# Patient Record
Sex: FEMALE
[De-identification: names, ages, dates, MRNs, and addresses within clinical notes are randomized per-mention and may not be internally consistent; named-entity substitution may affect disease eponyms.]

---

## 2017-06-03 ENCOUNTER — HEALTH MAINTENANCE LETTER (OUTPATIENT)
Age: 32
End: 2017-06-03

## 2017-07-15 ENCOUNTER — HOSPITAL ENCOUNTER (EMERGENCY)
Facility: CLINIC | Age: 32
Discharge: HOME | End: 2017-07-15
Attending: EMERGENCY MEDICINE | Admitting: EMERGENCY MEDICINE
Payer: COMMERCIAL

## 2017-07-15 VITALS
OXYGEN SATURATION: 98 % | RESPIRATION RATE: 16 BRPM | TEMPERATURE: 98.9 F | SYSTOLIC BLOOD PRESSURE: 128 MMHG | DIASTOLIC BLOOD PRESSURE: 84 MMHG | HEART RATE: 84 BPM

## 2017-07-15 DIAGNOSIS — F60.3 BORDERLINE PERSONALITY DISORDER (H): ICD-10-CM

## 2017-07-15 PROCEDURE — 99283 EMERGENCY DEPT VISIT LOW MDM: CPT

## 2017-07-15 PROCEDURE — 99283 EMERGENCY DEPT VISIT LOW MDM: CPT | Mod: Z6 | Performed by: EMERGENCY MEDICINE

## 2017-07-15 ASSESSMENT — ENCOUNTER SYMPTOMS
CHILLS: 0
FEVER: 1
PSYCHIATRIC NEGATIVE: 1
ABDOMINAL PAIN: 0
APPETITE CHANGE: 0

## 2017-07-15 NOTE — ED NOTES
Writer walked into room to ask pt to provide a urine sample, stated she now wants to leave AMA, wanted to  therapist but doesn't want to talk to one now, MD assessed her and she is ok to go.

## 2017-07-15 NOTE — ED PROVIDER NOTES
History     Chief Complaint   Patient presents with     Flank Pain     Kidney infection.      Depression     Denies SI.     HPI  Guinepeng Nazario is a 31 year old female who presents to the ED to see if she had a fever.  She says she was just dx with a kidney infection yesterday, got a shot and was placed on bactrim.  She took one bactrim.  She denies prior kidney infections.  She thought she had a fever this am so came to see.  She now wants to leave and doesn't want to talk anymore.  She thought she wanted to see a therapist but doesn't want to now.  Denies si/hi.  Doesn't have a therapist and doesn't want one.     I have reviewed the Medications, Allergies, Past Medical and Surgical History, and Social History in the Epic system.    Review of Systems   Constitutional: Positive for fever. Negative for appetite change and chills.   Gastrointestinal: Negative for abdominal pain.   Psychiatric/Behavioral: Negative.        Physical Exam   BP: 128/84  Pulse: 84  Temp: 98.9  F (37.2  C)  Resp: 16  SpO2: 98 %  Physical Exam   Constitutional: She is oriented to person, place, and time. She appears well-developed and well-nourished. No distress.   HENT:   Head: Atraumatic.   Neck: Normal range of motion.   Pulmonary/Chest: Effort normal.   Musculoskeletal: Normal range of motion.   Neurological: She is alert and oriented to person, place, and time.   Skin: Skin is warm and dry. She is not diaphoretic.   Psychiatric: Her speech is normal and behavior is normal. Judgment and thought content normal. Her mood appears anxious. Cognition and memory are normal.   Nursing note and vitals reviewed.      ED Course     ED Course     Procedures           Labs Ordered and Resulted from Time of ED Arrival Up to the Time of Departure from the ED - No data to display         Assessments & Plan (with Medical Decision Making)   The patient wanted to leave before being seen by me.  I asked her to chat for a few minutes to assess  the situation.  She denies si/hi.  She seems irritated by being asked to stay and talk to me.  She shows me paperwork of dx of kidney infection and recommendation of treating it with bactrim.  I do not have a reason to hold her further.  Very limited exam and hx given her wanting to go.  Consistent with borderline personality disorder.     I have reviewed the nursing notes.    I have reviewed the findings, diagnosis, plan and need for follow up with the patient.    New Prescriptions    No medications on file       Final diagnoses:   Borderline personality disorder       7/15/2017   [facility], EMERGENCY DEPARTMENT     Marleni Nguyen MD  07/15/17 0907

## 2017-07-15 NOTE — ED AVS SNAPSHOT
[facility], [place], Emergency Department    [address]    [place] [address]    Phone:  [phone]    Fax:  [phone]                                       Fanny Nazario   MRN: [id number]    Department:  [facility], Emergency Department   Date of Visit:  7/15/2017           After Visit Summary Signature Page     I have received my discharge instructions, and my questions have been answered. I have discussed any challenges I see with this plan with the nurse or doctor.    ..........................................................................................................................................  Patient/Patient Representative Signature      ..........................................................................................................................................  Patient Representative Print Name and Relationship to Patient    ..................................................               ................................................  Date                                            Time    ..........................................................................................................................................  Reviewed by Signature/Title    ...................................................              ..............................................  Date                                                            Time

## 2017-07-15 NOTE — ED AVS SNAPSHOT
[facility], Emergency Department    [address]    [place] [address]    Phone:  [phone]    Fax:  [phone]                                       Fanny Nazario   MRN: [id number]    Department:  [facility], Emergency Department   Date of Visit:  7/15/2017           Patient Information     Date Of Birth          1985        Your diagnoses for this visit were:     Borderline personality disorder        You were seen by Marleni Nguyen MD.      24 Hour Appointment Hotline       To make an appointment at any [facility] clinic, call [phone] ([phone]). If you don't have a family doctor or clinic, we will help you find one. [facility] clinics are conveniently located to serve the needs of you and your family.             Review of your medicines      Our records show that you are taking the medicines listed below. If these are incorrect, please call your family doctor or clinic.        Dose / Directions Last dose taken    OLANZapine 2.5 MG tablet   Commonly known as:  zyPREXA   Dose:  2.5 mg   Quantity:  30 tablet        Take 1 tablet (2.5 mg) by mouth At Bedtime   Refills:  0        PARoxetine 20 MG tablet   Commonly known as:  PAXIL   Dose:  20 mg   Quantity:  30 tablet        Take 1 tablet (20 mg) by mouth At Bedtime   Refills:  1                Orders Needing Specimen Collection     Ordered          07/15/17 0854  UA reflex to Microscopic - STAT, Prio: STAT, Needs to be Collected     Scheduled Task Status   07/15/17 0855 Collect UA reflex to Microscopic Open   Order Class:  PCU Collect                07/15/17 0854  HCG qualitative urine - STAT, Prio: STAT, Needs to be Collected     Scheduled Task Status   07/15/17 0855 Collect HCG qualitative urine Open   Order Class:  PCU Collect                  Pending Results     No orders found from 7/13/2017 to 7/16/2017.            Pending Culture Results     No orders found from 7/13/2017 to 7/16/2017.            Pending Results  Instructions     If you had any lab results that were not finalized at the time of your Discharge, you can call the ED Lab Result RN at [phone]. You will be contacted by this team for any positive Lab results or changes in treatment. The nurses are available 7 days a week from 10A to 6:30P.  You can leave a message 24 hours per day and they will return your call.        Thank you for choosing [facility]       Thank you for choosing [facility] for your care. Our goal is always to provide you with excellent care. Hearing back from our patients is one way we can continue to improve our services. Please take a few minutes to complete the written survey that you may receive in the mail after you visit with us. Thank you!        [employer] Information     [facility] gives you secure access to your electronic health record. If you see a primary care provider, you can also send messages to your care team and make appointments. If you have questions, please call your primary care clinic.  If you do not have a primary care provider, please call [phone] and they will assist you.        Care EveryWhere ID     This is your Care EveryWhere ID. This could be used by other organizations to access your [facility] medical records  [id number]        Equal Access to Services     WINNIE HALL : Hadii sierra Nelson, aky carroll, sol alcazar, isaiah corea. So [facility] [phone].    ATENCIÓN: Si habla español, tiene a meza disposición servicios gratuitos de asistencia lingüística. Llame al [phone].    We comply with applicable federal civil rights laws and [place] laws. We do not discriminate on the basis of race, color, national origin, age, disability sex, sexual orientation or gender identity.            After Visit Summary       This is your record. Keep this with you and show to your community pharmacist(s) and doctor(s) at your next visit.

## 2017-07-17 ENCOUNTER — HOSPITAL ENCOUNTER (EMERGENCY)
Facility: CLINIC | Age: 32
Discharge: HOME | End: 2017-07-17
Attending: EMERGENCY MEDICINE | Admitting: EMERGENCY MEDICINE
Payer: COMMERCIAL

## 2017-07-17 VITALS
OXYGEN SATURATION: 100 % | TEMPERATURE: 98.6 F | SYSTOLIC BLOOD PRESSURE: 114 MMHG | RESPIRATION RATE: 16 BRPM | HEART RATE: 67 BPM | WEIGHT: 168.1 LBS | DIASTOLIC BLOOD PRESSURE: 68 MMHG | BODY MASS INDEX: 30.75 KG/M2

## 2017-07-17 DIAGNOSIS — N10 ACUTE PYELONEPHRITIS: ICD-10-CM

## 2017-07-17 DIAGNOSIS — F31.9 BIPOLAR AFFECTIVE DISORDER, REMISSION STATUS UNSPECIFIED (H): ICD-10-CM

## 2017-07-17 DIAGNOSIS — R45.1 AGITATION: ICD-10-CM

## 2017-07-17 LAB
ALBUMIN UR-MCNC: NEGATIVE MG/DL
AMPHETAMINES UR QL SCN: ABNORMAL
APPEARANCE UR: CLEAR
BARBITURATES UR QL: ABNORMAL
BENZODIAZ UR QL: ABNORMAL
BILIRUB UR QL STRIP: NEGATIVE
CANNABINOIDS UR QL SCN: ABNORMAL
COCAINE UR QL: ABNORMAL
COLOR UR AUTO: YELLOW
ETHANOL UR QL SCN: ABNORMAL
GLUCOSE UR STRIP-MCNC: NEGATIVE MG/DL
HCG UR QL: NEGATIVE
HGB UR QL STRIP: NEGATIVE
KETONES UR STRIP-MCNC: NEGATIVE MG/DL
LEUKOCYTE ESTERASE UR QL STRIP: NEGATIVE
MUCOUS THREADS #/AREA URNS LPF: PRESENT /LPF
NITRATE UR QL: NEGATIVE
OPIATES UR QL SCN: ABNORMAL
PH UR STRIP: 6.5 PH (ref 5–7)
RBC #/AREA URNS AUTO: 4 /HPF (ref 0–2)
SP GR UR STRIP: 1.01 (ref 1–1.03)
SQUAMOUS #/AREA URNS AUTO: <1 /HPF (ref 0–1)
URN SPEC COLLECT METH UR: ABNORMAL
UROBILINOGEN UR STRIP-MCNC: NORMAL MG/DL (ref 0–2)
WBC #/AREA URNS AUTO: 1 /HPF (ref 0–2)

## 2017-07-17 PROCEDURE — 80307 DRUG TEST PRSMV CHEM ANLYZR: CPT | Performed by: PSYCHIATRY & NEUROLOGY

## 2017-07-17 PROCEDURE — 25000132 ZZH RX MED GY IP 250 OP 250 PS 637: Performed by: EMERGENCY MEDICINE

## 2017-07-17 PROCEDURE — 81001 URINALYSIS AUTO W/SCOPE: CPT | Performed by: EMERGENCY MEDICINE

## 2017-07-17 PROCEDURE — 99284 EMERGENCY DEPT VISIT MOD MDM: CPT | Mod: Z6 | Performed by: EMERGENCY MEDICINE

## 2017-07-17 PROCEDURE — 80320 DRUG SCREEN QUANTALCOHOLS: CPT | Performed by: PSYCHIATRY & NEUROLOGY

## 2017-07-17 PROCEDURE — 81025 URINE PREGNANCY TEST: CPT | Performed by: PSYCHIATRY & NEUROLOGY

## 2017-07-17 PROCEDURE — 90791 PSYCH DIAGNOSTIC EVALUATION: CPT

## 2017-07-17 PROCEDURE — 99285 EMERGENCY DEPT VISIT HI MDM: CPT | Mod: 25 | Performed by: EMERGENCY MEDICINE

## 2017-07-17 RX ORDER — SULFAMETHOXAZOLE/TRIMETHOPRIM 800-160 MG
1 TABLET ORAL 2 TIMES DAILY
COMMUNITY
End: 2017-07-17 | Stop reason: ALTCHOICE

## 2017-07-17 RX ORDER — CARBAMAZEPINE 100 MG/1
100 TABLET, EXTENDED RELEASE ORAL 2 TIMES DAILY
Qty: 60 TABLET | Refills: 0 | Status: SHIPPED | OUTPATIENT
Start: 2017-07-17

## 2017-07-17 RX ORDER — CIPROFLOXACIN 500 MG/1
500 TABLET, FILM COATED ORAL 2 TIMES DAILY
Qty: 20 TABLET | Refills: 0 | Status: SHIPPED | OUTPATIENT
Start: 2017-07-17 | End: 2017-07-27

## 2017-07-17 RX ORDER — CIPROFLOXACIN 500 MG/1
500 TABLET, FILM COATED ORAL ONCE
Status: COMPLETED | OUTPATIENT
Start: 2017-07-17 | End: 2017-07-17

## 2017-07-17 RX ADMIN — CIPROFLOXACIN HYDROCHLORIDE 500 MG: 500 TABLET, FILM COATED ORAL at 15:43

## 2017-07-17 ASSESSMENT — ENCOUNTER SYMPTOMS
DYSPHORIC MOOD: 0
HALLUCINATIONS: 0
ABDOMINAL PAIN: 0
CHEST TIGHTNESS: 0
BACK PAIN: 0
NERVOUS/ANXIOUS: 0
DIZZINESS: 0
AGITATION: 1
FEVER: 0
FLANK PAIN: 1
SHORTNESS OF BREATH: 0

## 2017-07-17 NOTE — ED PROVIDER NOTES
History     Chief Complaint   Patient presents with     Flank Pain     left flank pain, was treated a couple of days ago for a kidney infection. continues to have left flank pain, is starting to get pain in the right flank. Pt states the antibiotics are causing serious mood changes     The history is provided by the patient and medical records.     Fanny Nazario is a 31 year old female who comes from the ED after she medically treated for her pyelonephritis.  She asked the MD in the ED for medications for her bipolar.  Please see Dr. Rodarte's note for details and physical exam.  She states that she has bipolar disorder mixed state.  The records discuss PTSD and borderline personality disorder as well.  She is irritable and opinionated about her care.  She states she has tried lamictal and got a rash. She also has tried many other medication causing more issues or they didn't work. She denies any suicidal or homicidal thoughts.  She feels sad but also restless. She is frustrated how easily upset she gets.  She has done research on tegretol and she believes it is the best medication for her.  She would like to start this as she thinks it will be helpful. She does not want therapy.    Please see the 's assessment in [employer] from today for further details.    I have reviewed the Medications, Allergies, Past Medical and Surgical History, and Social History in the Epic system.    Review of Systems   Constitutional: Negative for fever.   Eyes: Negative for visual disturbance.   Respiratory: Negative for chest tightness and shortness of breath.    Cardiovascular: Negative for chest pain.   Gastrointestinal: Negative for abdominal pain.   Musculoskeletal: Negative for back pain.   Neurological: Negative for dizziness.   Psychiatric/Behavioral: Positive for agitation. Negative for dysphoric mood, hallucinations, self-injury and suicidal ideas. The patient is not nervous/anxious.    All other systems  reviewed and are negative.      Physical Exam   BP: 111/67  Pulse: 86  Temp: 98.9  F (37.2  C)  Resp: 16  Weight: 76.2 kg (168 lb 1.6 oz)  SpO2: 92 %  Physical Exam   Constitutional: She is oriented to person, place, and time. She appears well-developed and well-nourished.   Neurological: She is alert and oriented to person, place, and time.   Psychiatric: She has a normal mood and affect. Her speech is normal. Judgment and thought content normal. She is agitated. She is not actively hallucinating. Thought content is not paranoid and not delusional. Cognition and memory are normal. She expresses no homicidal and no suicidal ideation. She expresses no suicidal plans and no homicidal plans.   Fanny is a 32 y/o female who looks her age.  She is well groomed with good eye contact.  She is somewhat irritable but does calm and redirect.   Nursing note and vitals reviewed.      ED Course     ED Course     Procedures               Labs Ordered and Resulted from Time of ED Arrival Up to the Time of Departure from the ED   ROUTINE UA WITH MICROSCOPIC REFLEX TO CULTURE - Abnormal; Notable for the following:        Result Value    RBC Urine 4 (*)     Mucous Urine Present (*)     All other components within normal limits   DRUG ABUSE SCREEN 6 CHEM DEP URINE ([facility]) - Abnormal; Notable for the following:     Cannabinoids Qual Urine   (*)     Value: Positive   Cutoff for a positive cannabinoid is greater than 50 ng/mL. This is an   unconfirmed screening result to be used for medical purposes only.      All other components within normal limits   HCG QUALITATIVE URINE            Assessments & Plan (with Medical Decision Making)   Fanny will be discharged home.  She is not an imminent risk to herself or others.  She will be started on tegretol  mg bid.  She had done research and felt this is the best medication for her bipolar mixed state.  She was well versed in its side effects.  This seems to be decent choice for her  bipolar considering she has failed other medications and she has investment in this medication.  She understands that she needs close follow up for this including labwork.  She got an appointment with a medication provider on 8/10/17.  She is hoping to get into the [facility] psychiatry clinic as she has been there before, but she may not be able to get into an appointment soon enough.  I did explain to her that I would start the medication if she had a follow up appointment so she took the appointment on 8/10/17.      I have reviewed the nursing notes.    I have reviewed the findings, diagnosis, plan and need for follow up with the patient.    New Prescriptions    CARBAMAZEPINE (TEGRETOL XR) 100 MG 12 HR TABLET    Take 1 tablet (100 mg) by mouth 2 times daily    CIPROFLOXACIN (CIPRO) 500 MG TABLET    Take 1 tablet (500 mg) by mouth 2 times daily for 10 days       Final diagnoses:   Acute pyelonephritis - left, partially treated   Agitation   Bipolar affective disorder, remission status unspecified (H)       7/17/2017   [facility], [place], EMERGENCY DEPARTMENT     Gagan Cain MD  07/17/17 0017

## 2017-07-17 NOTE — ED PROVIDER NOTES
History     Chief Complaint   Patient presents with     Flank Pain     left flank pain, was treated a couple of days ago for a kidney infection. continues to have left flank pain, is starting to get pain in the right flank. Pt states the antibiotics are causing serious mood changes     HPI  Fanny Nazario is a 31-year-old female who presents to the Emergency Department with 2 complaints.  The 1st complaint is that she has recently been treated for a left pyelonephritis and was placed on Bactrim 3 days ago but recently found out that the Bactrim is not effective for her E. coli UTI and that her left flank pain continues.  Patient states that she was telephoned by the clinic and was told to switch antibiotics.  Meanwhile the patient also complains that the Bactrim is causing her to be increasingly agitated.  Patient states that she would like to see our psychiatrist here to get evaluated for her agitation. This patient has a diagnosis of borderline personality and has been treated with antidepressants here in the past but is not currently on any antidepressants.  Patient denies any vomiting and denies possibility of pregnancy stating that her tubes are tied and gave verbal permission to look into care everywhere for her [facility].    Care everywhere revealed the patient has no official lab results in the chart but the narrative in the chart reveals that the patient did have red cells and white cells and was started on Bactrim.  Sensitivities were not fully explained however the note does say that the patient was to be switched to Cipro because the bacteria, E. coli, is resistant to Bactrim.  Her pregnancy test was negative in the narrative notes.    I have reviewed the Medications, Allergies, Past Medical and Surgical History, and Social History in the [facility] system.    PAST MEDICAL HISTORY:   Past Medical History:   Diagnosis Date     Bipolar 1 disorder (H)      Borderline personality disorder       Endometriosis      H/O LEEP (loop electrosurgical excision procedure) of cervix complicating pregnancy          H/O premature delivery      Herpes genitalia      History of cervical cerclage, currently pregnant      HPV in female      Mood disorder (H)       (normal spontaneous vaginal delivery)     x2     Postpartum depression      PROM (premature rupture of membranes)     at 27 weeks x2     PTSD (post-traumatic stress disorder)      STD (female)        PAST SURGICAL HISTORY:   Past Surgical History:   Procedure Laterality Date     ABDOMEN SURGERY      laparscopy, r/o ectopic-ruptured cyst      SECTION, TUBAL LIGATION, COMBINED  2014    Procedure: COMBINED  SECTION, TUBAL LIGATION;;  Surgeon: Becky Velazquez MD;  Location:  L+D     GYN SURGERY  2005    laparoscopy, endometriosis     LEEP TX, CERVICAL  2004       FAMILY HISTORY:   Family History   Problem Relation Age of Onset     DIABETES Maternal Grandmother      Hypertension Maternal Aunt      Blood Disease Mother      AIDS       SOCIAL HISTORY:   Social History   Substance Use Topics     Smoking status: Former Smoker     Packs/day: 0.50     Years: 13.00     Types: Cigarettes     Quit date: 2013     Smokeless tobacco: Never Used     Alcohol use No     Previous Medications    No medications on file        Allergies   Allergen Reactions     Depacon [Valproic Acid]      Migrane     Paxil [Paroxetine]      Lamictal [Lamotrigine] Rash         Review of Systems   Genitourinary: Positive for flank pain.   Psychiatric/Behavioral: Positive for agitation.   All other systems reviewed and are negative.      Physical Exam   BP: 111/67  Pulse: 86  Temp: 98.9  F (37.2  C)  Resp: 16  Weight: 76.2 kg (168 lb 1.6 oz)  SpO2: 92 %  Physical Exam   Constitutional: She is oriented to person, place, and time.   Calm, sitting comfortably and conversant   HENT:   Head: Atraumatic.   Eyes: EOM are normal. Pupils are equal, round, and reactive to  light.   Neck: Neck supple.   Abdominal: Soft. There is no tenderness.   Musculoskeletal: She exhibits no edema or tenderness.   Neurological: She is alert and oriented to person, place, and time.   Grossly intact and symmetric   Skin: Skin is warm. She is not diaphoretic.   Psychiatric:   Slightly agitated       ED Course     ED Course     Procedures        Results for orders placed or performed during the hospital encounter of 07/17/17   UA with Microscopic reflex to Culture   Result Value Ref Range    Color Urine Yellow     Appearance Urine Clear     Glucose Urine Negative NEG mg/dL    Bilirubin Urine Negative NEG    Ketones Urine Negative NEG mg/dL    Specific Gravity Urine 1.012 1.003 - 1.035    Blood Urine Negative NEG    pH Urine 6.5 5.0 - 7.0 pH    Protein Albumin Urine Negative NEG mg/dL    Urobilinogen mg/dL Normal 0.0 - 2.0 mg/dL    Nitrite Urine Negative NEG    Leukocyte Esterase Urine Negative NEG    Source Unspecified Urine     WBC Urine 1 0 - 2 /HPF    RBC Urine 4 (H) 0 - 2 /HPF    Squamous Epithelial /HPF Urine <1 0 - 1 /HPF    Mucous Urine Present (A) NEG /LPF       Labs Ordered and Resulted from Time of ED Arrival Up to the Time of Departure from the ED   ROUTINE UA WITH MICROSCOPIC REFLEX TO CULTURE - Abnormal; Notable for the following:        Result Value    RBC Urine 4 (*)     Mucous Urine Present (*)     All other components within normal limits            Assessments & Plan (with Medical Decision Making)     I have reviewed the nursing notes.    Patient will be switched from the Bactrim to Cipro as indicated in the primary care providers notes.  Patient's urine testing here is unimpressive.  Patient will have the Bactrim discontinued.  No further medical workup is necessary at this time.    Medications   ciprofloxacin (CIPRO) tablet 500 mg (500 mg Oral Given 7/17/17 3877)       Patient still wishes to be seen in the behavioral emergency center for her increasing agitation.    I have reviewed  the findings, diagnosis, and plan with the patient.    New Prescriptions    CIPROFLOXACIN (CIPRO) 500 MG TABLET    Take 1 tablet (500 mg) by mouth 2 times daily for 10 days       Working diagnoses:   Acute pyelonephritis - left, partially treated   Agitation     Please make an appointment to follow up with Your Primary Care Provider in 7-10 days for recheck.    Return to the ER for fevers or vomiting.    I, Ike Barillas, am serving as a trained medical scribe to document services personally performed by Filemon Rodarte MD, based on the provider's statements to me.   IFilemon MD, was physically present and have reviewed and verified the accuracy of this note documented by Ike Barillas.      7/17/2017   [facility], [place], EMERGENCY DEPARTMENT     Filemon Rodarte MD  07/17/17 1543

## 2017-07-17 NOTE — ED NOTES
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  Denies feeling suicidal.

## 2017-07-17 NOTE — ED AVS SNAPSHOT
[facility], [place], Emergency Department    [address]    [place] [address]    Phone:  [phone]    Fax:  [phone]                                       Fanny Nazario   MRN: [id number]    Department:  [facility], Emergency Department   Date of Visit:  7/17/2017           After Visit Summary Signature Page     I have received my discharge instructions, and my questions have been answered. I have discussed any challenges I see with this plan with the nurse or doctor.    ..........................................................................................................................................  Patient/Patient Representative Signature      ..........................................................................................................................................  Patient Representative Print Name and Relationship to Patient    ..................................................               ................................................  Date                                            Time    ..........................................................................................................................................  Reviewed by Signature/Title    ...................................................              ..............................................  Date                                                            Time

## 2017-07-17 NOTE — DISCHARGE INSTRUCTIONS
Please make an appointment to follow up with Your Primary Care Provider in 7-10 days for recheck.    Return to the ER for fevers or vomiting.    Start tegretol xr 100 mg twice a day.   You need to follow up with psychiatry for labs and medication management.  If a rash develops, stop medication and see your provider    Go to psychiatry on Thursday August 10th at 2 pm

## 2017-07-17 NOTE — ED AVS SNAPSHOT
[facility], Emergency Department    [address]    [place] [address]    Phone:  [phone]    Fax:  [phone]                                       Fanny Nazario   MRN: [id number]    Department:  [facility], Emergency Department   Date of Visit:  7/17/2017           Patient Information     Date Of Birth          1985        Your diagnoses for this visit were:     Acute pyelonephritis left, partially treated    Agitation     Bipolar affective disorder, remission status unspecified (H)        You were seen by Filemon Rodarte MD and Gagan Cain MD.        Discharge Instructions       Please make an appointment to follow up with Your Primary Care Provider in 7-10 days for recheck.    Return to the ER for fevers or vomiting.    Start tegretol xr 100 mg twice a day.   You need to follow up with psychiatry for labs and medication management.  If a rash develops, stop medication and see your provider    Go to psychiatry on Thursday August 10th at 2 pm      Discharge References/Attachments     PYELONEPHRITIS, FEMALE (ADULT) (ENGLISH)      24 Hour Appointment Hotline       To make an appointment at any [facility] clinic, call [phone] ([phone]). If you don't have a family doctor or clinic, we will help you find one. [facility] clinics are conveniently located to serve the needs of you and your family.             Review of your medicines      START taking        Dose / Directions Last dose taken    carBAMazepine 100 MG 12 hr tablet   Commonly known as:  TEGretol XR   Dose:  100 mg   Quantity:  60 tablet        Take 1 tablet (100 mg) by mouth 2 times daily   Refills:  0        ciprofloxacin 500 MG tablet   Commonly known as:  CIPRO   Dose:  500 mg   Indication:  Urinary Tract Infection   Quantity:  20 tablet        Take 1 tablet (500 mg) by mouth 2 times daily for 10 days   Refills:  0          STOP taking        Dose Reason for stopping Comments     sulfamethoxazole-trimethoprim 800-160 MG per tablet   Commonly known as:  BACTRIM DS/SEPTRA DS                      Prescriptions were sent or printed at these locations (2 Prescriptions)                   Other Prescriptions                Printed at Department/Unit printer (2 of 2)         ciprofloxacin (CIPRO) 500 MG tablet               carBAMazepine (TEGRETOL XR) 100 MG 12 hr tablet                Procedures and tests performed during your visit     Drug abuse screen 6 urine (tox)    HCG qualitative urine    UA with Microscopic reflex to Culture      Orders Needing Specimen Collection     None      Pending Results     No orders found from 7/15/2017 to 7/18/2017.            Pending Culture Results     No orders found from 7/15/2017 to 7/18/2017.            Pending Results Instructions     If you had any lab results that were not finalized at the time of your Discharge, you can call the ED Lab Result RN at [phone]. You will be contacted by this team for any positive Lab results or changes in treatment. The nurses are available 7 days a week from 10A to 6:30P.  You can leave a message 24 hours per day and they will return your call.        Thank you for choosing [facility]       Thank you for choosing [facility] for your care. Our goal is always to provide you with excellent care. Hearing back from our patients is one way we can continue to improve our services. Please take a few minutes to complete the written survey that you may receive in the mail after you visit with us. Thank you!        Who What [facility] Information     [employer] gives you secure access to your electronic health record. If you see a primary care provider, you can also send messages to your care team and make appointments. If you have questions, please call your primary care clinic.  If you do not have a primary care provider, please call [phone] and they will assist you.        Care EveryWhere ID     This is your Care EveryWhere ID. This could be  used by other organizations to access your [facility] medical records  [id number]        Equal Access to Services     WINNIE HALL : Lamine Nelson, kay carroll, isaiah lopez. So [facility] [phone].    ATENCIÓN: Si habla español, tiene a meza disposición servicios gratuitos de asistencia lingüística. Llame al [phone].    We comply with applicable federal civil rights laws and [place] laws. We do not discriminate on the basis of race, color, national origin, age, disability sex, sexual orientation or gender identity.            After Visit Summary       This is your record. Keep this with you and show to your community pharmacist(s) and doctor(s) at your next visit.

## 2017-07-17 NOTE — SUMMARY OF CARE
Patient safety screen completed; pt wanded with metal detector and belongings given to security to be stored. Explained to patient that she would go over to the [facility] when a bed is available to meet with a doctor and an accessor; plan will be determined from there.

## 2018-06-09 ENCOUNTER — HEALTH MAINTENANCE LETTER (OUTPATIENT)
Age: 33
End: 2018-06-09

## 2019-09-28 ENCOUNTER — HEALTH MAINTENANCE LETTER (OUTPATIENT)
Age: 34
End: 2019-09-28

## 2021-01-10 ENCOUNTER — HEALTH MAINTENANCE LETTER (OUTPATIENT)
Age: 36
End: 2021-01-10

## 2021-10-23 ENCOUNTER — HEALTH MAINTENANCE LETTER (OUTPATIENT)
Age: 36
End: 2021-10-23

## 2022-02-12 ENCOUNTER — HEALTH MAINTENANCE LETTER (OUTPATIENT)
Age: 37
End: 2022-02-12

## 2022-10-06 PROCEDURE — 88307 TISSUE EXAM BY PATHOLOGIST: CPT | Mod: TC,ORL | Performed by: STUDENT IN AN ORGANIZED HEALTH CARE EDUCATION/TRAINING PROGRAM

## 2022-10-07 ENCOUNTER — LAB REQUISITION (OUTPATIENT)
Dept: LAB | Facility: CLINIC | Age: 37
End: 2022-10-07
Payer: COMMERCIAL

## 2022-10-10 ENCOUNTER — HEALTH MAINTENANCE LETTER (OUTPATIENT)
Age: 37
End: 2022-10-10

## 2022-10-10 LAB
PATH REPORT.COMMENTS IMP SPEC: NORMAL
PATH REPORT.COMMENTS IMP SPEC: NORMAL
PATH REPORT.FINAL DX SPEC: NORMAL
PATH REPORT.GROSS SPEC: NORMAL
PATH REPORT.MICROSCOPIC SPEC OTHER STN: NORMAL
PATH REPORT.RELEVANT HX SPEC: NORMAL
PHOTO IMAGE: NORMAL

## 2022-10-10 PROCEDURE — 88307 TISSUE EXAM BY PATHOLOGIST: CPT | Mod: 26 | Performed by: STUDENT IN AN ORGANIZED HEALTH CARE EDUCATION/TRAINING PROGRAM

## 2023-02-18 ENCOUNTER — HEALTH MAINTENANCE LETTER (OUTPATIENT)
Age: 38
End: 2023-02-18

## 2024-03-16 ENCOUNTER — HEALTH MAINTENANCE LETTER (OUTPATIENT)
Age: 39
End: 2024-03-16

## 2025-05-19 ENCOUNTER — HOSPITAL ENCOUNTER (OUTPATIENT)
Dept: MAMMOGRAPHY | Facility: CLINIC | Age: 40
Discharge: HOME | End: 2025-05-19
Attending: STUDENT IN AN ORGANIZED HEALTH CARE EDUCATION/TRAINING PROGRAM

## 2025-05-19 DIAGNOSIS — N60.19 FIBROCYSTIC BREAST CHANGES: ICD-10-CM

## 2025-05-19 PROCEDURE — 76642 ULTRASOUND BREAST LIMITED: CPT | Mod: RT

## 2025-05-19 PROCEDURE — 77062 BREAST TOMOSYNTHESIS BI: CPT

## 2025-05-24 ENCOUNTER — HEALTH MAINTENANCE LETTER (OUTPATIENT)
Age: 40
End: 2025-05-24